# Patient Record
Sex: FEMALE | Race: WHITE | Employment: STUDENT | URBAN - METROPOLITAN AREA
[De-identification: names, ages, dates, MRNs, and addresses within clinical notes are randomized per-mention and may not be internally consistent; named-entity substitution may affect disease eponyms.]

---
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
## 2019-02-07 ENCOUNTER — HOSPITAL ENCOUNTER (EMERGENCY)
Age: 19
Discharge: HOME | End: 2019-02-07
Attending: EMERGENCY MEDICINE
Payer: COMMERCIAL

## 2019-02-07 VITALS
HEIGHT: 62 IN | RESPIRATION RATE: 16 BRPM | DIASTOLIC BLOOD PRESSURE: 62 MMHG | TEMPERATURE: 98 F | HEART RATE: 64 BPM | BODY MASS INDEX: 38.64 KG/M2 | SYSTOLIC BLOOD PRESSURE: 120 MMHG | OXYGEN SATURATION: 99 % | WEIGHT: 210 LBS

## 2019-02-07 DIAGNOSIS — N12 PYELONEPHRITIS: Primary | ICD-10-CM

## 2019-02-07 LAB
BILIRUB UR QL STRIP.AUTO: NEGATIVE
COLOR UR AUTO: YELLOW
GLUCOSE UR STRIP.AUTO-MCNC: NEGATIVE MG/DL
KETONES UR STRIP.AUTO-MCNC: NEGATIVE MG/DL
NITRITE UR QL STRIP.AUTO: NEGATIVE
PH UR STRIP.AUTO: 6.5 [PH] (ref 4.5–8)
POCT LOT NUMBER: NORMAL
POCT URINE PREGNANCY: NEGATIVE
PROT UR STRIP.AUTO-MCNC: NEGATIVE MG/DL
RBC UR QL AUTO: NEGATIVE
SP GR UR STRIP.AUTO: 1.02 (ref 1–1.03)
UROBILINOGEN UR STRIP.AUTO-MCNC: 0.2 MG/DL
WBC #/AREA URNS AUTO: >50 /HPF

## 2019-02-07 PROCEDURE — 87088 URINE BACTERIA CULTURE: CPT | Performed by: EMERGENCY MEDICINE

## 2019-02-07 PROCEDURE — 87086 URINE CULTURE/COLONY COUNT: CPT | Performed by: EMERGENCY MEDICINE

## 2019-02-07 PROCEDURE — 87186 SC STD MICRODIL/AGAR DIL: CPT | Performed by: EMERGENCY MEDICINE

## 2019-02-07 PROCEDURE — 81001 URINALYSIS AUTO W/SCOPE: CPT | Performed by: EMERGENCY MEDICINE

## 2019-02-07 PROCEDURE — 99284 EMERGENCY DEPT VISIT MOD MDM: CPT

## 2019-02-07 PROCEDURE — 99283 EMERGENCY DEPT VISIT LOW MDM: CPT

## 2019-02-07 PROCEDURE — 81025 URINE PREGNANCY TEST: CPT

## 2019-02-07 RX ORDER — SULFAMETHOXAZOLE AND TRIMETHOPRIM 800; 160 MG/1; MG/1
1 TABLET ORAL ONCE
Status: COMPLETED | OUTPATIENT
Start: 2019-02-07 | End: 2019-02-07

## 2019-02-07 RX ORDER — SULFAMETHOXAZOLE AND TRIMETHOPRIM 800; 160 MG/1; MG/1
1 TABLET ORAL 2 TIMES DAILY
Qty: 20 TABLET | Refills: 0 | Status: SHIPPED | OUTPATIENT
Start: 2019-02-07 | End: 2019-02-17

## 2019-02-07 NOTE — ED INITIAL ASSESSMENT (HPI)
Urinary frequency and urgency; right sided flank pain; Pt was on abx one month ago, s/s never resolved

## 2019-02-08 NOTE — ED PROVIDER NOTES
Patient Seen in: [facility] In [place]    History   Patient presents with:  Urinary Symptoms (urologic)    Stated Complaint: urinary symptoms    HPI    Patient is a 4year-old female here with mom concerned about possible kidney infec breath sounds normal. No stridor. Abdominal: Soft. There is no tenderness. There is no guarding. Musculoskeletal: Exhibits no edema or tenderness. Neurological: Pt is alert and oriented to person, place, and time. No cranial nerve deficit.    Skin: Sk

## 2019-02-11 ENCOUNTER — TELEPHONE (OUTPATIENT)
Dept: FAMILY MEDICINE CLINIC | Facility: CLINIC | Age: 19
End: 2019-02-11

## 2019-02-11 NOTE — TELEPHONE ENCOUNTER
Called patient's mother and left her VM to call back. I called patient. She was seen in the ER on 2/7 for a UTI. She had a UTI in December but feels her symptoms never completely resolved. Today she is feeling better but still having some symptoms.  She not

## 2019-02-11 NOTE — TELEPHONE ENCOUNTER
Patient was in the ER and diagnosed with kidney infection, Pt told her mom today that when she goes to the bathroom she still sees blood. Pt's mom  would like an advise if she should schedule appointment with PCP or go back to ER.

## 2019-02-12 ENCOUNTER — OFFICE VISIT (OUTPATIENT)
Dept: FAMILY MEDICINE CLINIC | Facility: CLINIC | Age: 19
End: 2019-02-12
Payer: COMMERCIAL

## 2019-02-12 VITALS
OXYGEN SATURATION: 100 % | BODY MASS INDEX: 39.69 KG/M2 | RESPIRATION RATE: 18 BRPM | DIASTOLIC BLOOD PRESSURE: 76 MMHG | HEART RATE: 72 BPM | TEMPERATURE: 99 F | WEIGHT: 224 LBS | SYSTOLIC BLOOD PRESSURE: 118 MMHG | HEIGHT: 63 IN

## 2019-02-12 DIAGNOSIS — N12 PYELONEPHRITIS: Primary | ICD-10-CM

## 2019-02-12 LAB
MULTISTIX LOT#: ABNORMAL NUMERIC
PH, URINE: 6 (ref 4.5–8)
SPECIFIC GRAVITY: 1.01 (ref 1–1.03)
URINE-COLOR: YELLOW
UROBILINOGEN,SEMI-QN: 0.2 MG/DL (ref 0–1.9)

## 2019-02-12 PROCEDURE — 81003 URINALYSIS AUTO W/O SCOPE: CPT | Performed by: FAMILY MEDICINE

## 2019-02-12 PROCEDURE — 87086 URINE CULTURE/COLONY COUNT: CPT | Performed by: FAMILY MEDICINE

## 2019-02-12 PROCEDURE — 99213 OFFICE O/P EST LOW 20 MIN: CPT | Performed by: FAMILY MEDICINE

## 2019-02-12 NOTE — PROGRESS NOTES
Kaila Hope is a 22year old female. Patient presents with:  ER F/U: THE [facility] Emergency Room f/u 2/7/19 Pylonephritis      HPI:   Patient presents with symptoms of UTI.  Complaining of urinary frequency, urgency, dysuria, suprapubic pain for lastsever don't resolve. Call if fever, vomiting, worsening symptoms.

## 2019-02-15 ENCOUNTER — TELEPHONE (OUTPATIENT)
Dept: FAMILY MEDICINE CLINIC | Facility: CLINIC | Age: 19
End: 2019-02-15

## 2019-04-16 ENCOUNTER — LAB ENCOUNTER (OUTPATIENT)
Dept: LAB | Age: 19
End: 2019-04-16
Attending: NURSE PRACTITIONER
Payer: COMMERCIAL

## 2019-04-16 ENCOUNTER — HOSPITAL ENCOUNTER (OUTPATIENT)
Dept: ULTRASOUND IMAGING | Age: 19
Discharge: HOME | End: 2019-04-16
Attending: NURSE PRACTITIONER
Payer: COMMERCIAL

## 2019-04-16 ENCOUNTER — OFFICE VISIT (OUTPATIENT)
Dept: FAMILY MEDICINE CLINIC | Facility: CLINIC | Age: 19
End: 2019-04-16
Payer: COMMERCIAL

## 2019-04-16 VITALS
HEART RATE: 72 BPM | RESPIRATION RATE: 16 BRPM | OXYGEN SATURATION: 98 % | BODY MASS INDEX: 39.16 KG/M2 | SYSTOLIC BLOOD PRESSURE: 110 MMHG | DIASTOLIC BLOOD PRESSURE: 70 MMHG | HEIGHT: 63 IN | WEIGHT: 221 LBS | TEMPERATURE: 98 F

## 2019-04-16 DIAGNOSIS — R10.9 LEFT FLANK PAIN: ICD-10-CM

## 2019-04-16 DIAGNOSIS — R30.9 PAIN WITH URINATION: Primary | ICD-10-CM

## 2019-04-16 DIAGNOSIS — N10 ACUTE PYELONEPHRITIS: ICD-10-CM

## 2019-04-16 DIAGNOSIS — N20.0 KIDNEY CALCULUS: ICD-10-CM

## 2019-04-16 PROCEDURE — 76770 US EXAM ABDO BACK WALL COMP: CPT | Performed by: NURSE PRACTITIONER

## 2019-04-16 PROCEDURE — 99214 OFFICE O/P EST MOD 30 MIN: CPT | Performed by: NURSE PRACTITIONER

## 2019-04-16 PROCEDURE — 87186 SC STD MICRODIL/AGAR DIL: CPT | Performed by: NURSE PRACTITIONER

## 2019-04-16 PROCEDURE — 87088 URINE BACTERIA CULTURE: CPT | Performed by: NURSE PRACTITIONER

## 2019-04-16 PROCEDURE — 80053 COMPREHEN METABOLIC PANEL: CPT | Performed by: NURSE PRACTITIONER

## 2019-04-16 PROCEDURE — 87086 URINE CULTURE/COLONY COUNT: CPT | Performed by: NURSE PRACTITIONER

## 2019-04-16 PROCEDURE — 81003 URINALYSIS AUTO W/O SCOPE: CPT | Performed by: NURSE PRACTITIONER

## 2019-04-16 PROCEDURE — 36415 COLL VENOUS BLD VENIPUNCTURE: CPT | Performed by: NURSE PRACTITIONER

## 2019-04-16 PROCEDURE — 85025 COMPLETE CBC W/AUTO DIFF WBC: CPT | Performed by: NURSE PRACTITIONER

## 2019-04-16 RX ORDER — ACETAMINOPHEN AND CODEINE PHOSPHATE 120; 12 MG/5ML; MG/5ML
SOLUTION ORAL
COMMUNITY
Start: 2019-04-15 | End: 2019-07-09 | Stop reason: ALTCHOICE

## 2019-04-16 RX ORDER — SULFAMETHOXAZOLE AND TRIMETHOPRIM 800; 160 MG/1; MG/1
1 TABLET ORAL 2 TIMES DAILY
Qty: 20 TABLET | Refills: 0 | Status: SHIPPED | OUTPATIENT
Start: 2019-04-16 | End: 2019-04-26

## 2019-04-16 NOTE — PROGRESS NOTES
CHIEF COMPLAINT:   Patient presents with:  UTI: freq urination, pain with urination nausea and left flank pain      HPI:   Maryam Meng is a 22year old female who presents with symptoms of UTI.  Complaining of urinary frequency, urgency, dysuria for hour(s)). ASSESSMENT AND PLAN:   Diagnoses and all orders for this visit:    Pain with urination  -     URINALYSIS, AUTO, W/O SCOPE  -     URINE CULTURE, ROUTINE; Future    Left flank pain  -     CBC WITH DIFFERENTIAL WITH PLATELET;  Future  -     COMP

## 2019-04-19 ENCOUNTER — TELEPHONE (OUTPATIENT)
Dept: FAMILY MEDICINE CLINIC | Facility: CLINIC | Age: 19
End: 2019-04-19

## 2019-04-19 NOTE — TELEPHONE ENCOUNTER
----- Message from MARGY Medina sent at 4/18/2019  9:34 AM CDT -----  Urine culture - have bacteria -finish antibiotics as prescribed      Notes recorded by MARGY Diana on 4/18/2019 at 9:31 AM CDT  Ordered additional labs for elevated estevan

## 2019-04-25 ENCOUNTER — HOSPITAL ENCOUNTER (OUTPATIENT)
Dept: GENERAL RADIOLOGY | Age: 19
Discharge: HOME | End: 2019-04-25
Attending: PHYSICIAN ASSISTANT
Payer: COMMERCIAL

## 2019-04-25 DIAGNOSIS — N20.0 LEFT NEPHROLITHIASIS: ICD-10-CM

## 2019-04-25 PROCEDURE — 74018 RADEX ABDOMEN 1 VIEW: CPT | Performed by: PHYSICIAN ASSISTANT

## 2019-04-29 NOTE — PROGRESS NOTES
Patient notified via [address] Result has been released, patient account is currently active. No evidence of stone. Assess symptoms, consider CT.

## 2019-05-15 NOTE — TELEPHONE ENCOUNTER
Unable to leave message as mailbox is full. MY chart sent to patient to schedule US. May close message when noted message was read by patient.

## 2019-07-01 ENCOUNTER — HOSPITAL ENCOUNTER (OUTPATIENT)
Dept: ULTRASOUND IMAGING | Age: 19
Discharge: HOME | End: 2019-07-01
Attending: NURSE PRACTITIONER
Payer: COMMERCIAL

## 2019-07-01 ENCOUNTER — TELEPHONE (OUTPATIENT)
Dept: FAMILY MEDICINE CLINIC | Facility: CLINIC | Age: 19
End: 2019-07-01

## 2019-07-01 DIAGNOSIS — N20.0 KIDNEY CALCULUS: ICD-10-CM

## 2019-07-01 DIAGNOSIS — R79.89 ABNORMAL LFTS: ICD-10-CM

## 2019-07-01 DIAGNOSIS — R10.9 LEFT FLANK PAIN: Primary | ICD-10-CM

## 2019-07-01 PROCEDURE — 76705 ECHO EXAM OF ABDOMEN: CPT | Performed by: NURSE PRACTITIONER

## 2019-07-01 PROCEDURE — 76700 US EXAM ABDOM COMPLETE: CPT | Performed by: NURSE PRACTITIONER

## 2019-07-01 NOTE — TELEPHONE ENCOUNTER
US ABDOMEN COMPLETE   Notes recorded by MARGY Raymundo on 7/1/2019 at 2:39 PM CDT  Fatty Liver - low fat diet , showing kidney stone left -referral to see Urologist for evaluation and treatment

## 2019-07-01 NOTE — TELEPHONE ENCOUNTER
Spoke with pt regarding results and instructions listed below. Pt verbalizes understanding. Pt states she has seen Dr. Jay Guerra office before and will follow up with them.

## 2019-07-09 ENCOUNTER — LAB ENCOUNTER (OUTPATIENT)
Dept: LAB | Age: 19
End: 2019-07-09
Attending: FAMILY MEDICINE
Payer: COMMERCIAL

## 2019-07-09 ENCOUNTER — OFFICE VISIT (OUTPATIENT)
Dept: FAMILY MEDICINE CLINIC | Facility: CLINIC | Age: 19
End: 2019-07-09
Payer: COMMERCIAL

## 2019-07-09 VITALS
TEMPERATURE: 99 F | SYSTOLIC BLOOD PRESSURE: 116 MMHG | WEIGHT: 220 LBS | DIASTOLIC BLOOD PRESSURE: 72 MMHG | OXYGEN SATURATION: 99 % | HEIGHT: 63 IN | HEART RATE: 74 BPM | BODY MASS INDEX: 38.98 KG/M2 | RESPIRATION RATE: 20 BRPM

## 2019-07-09 DIAGNOSIS — F32.A MILD DEPRESSION: ICD-10-CM

## 2019-07-09 DIAGNOSIS — N20.0 NEPHROLITHIASIS: ICD-10-CM

## 2019-07-09 DIAGNOSIS — Z00.00 ROUTINE GENERAL MEDICAL EXAMINATION AT A HEALTH CARE FACILITY: Primary | ICD-10-CM

## 2019-07-09 DIAGNOSIS — E66.01 SEVERE OBESITY (BMI 35.0-39.9) WITH COMORBIDITY (HCC): ICD-10-CM

## 2019-07-09 DIAGNOSIS — R74.8 ELEVATED LIVER ENZYMES: ICD-10-CM

## 2019-07-09 DIAGNOSIS — R53.83 OTHER FATIGUE: ICD-10-CM

## 2019-07-09 DIAGNOSIS — R74.8 ELEVATED LIVER ENZYMES: Primary | ICD-10-CM

## 2019-07-09 LAB
ALBUMIN SERPL-MCNC: 3.8 G/DL (ref 3.4–5)
ALP LIVER SERPL-CCNC: 114 U/L (ref 52–144)
ALT SERPL-CCNC: 80 U/L (ref 13–56)
ANION GAP SERPL CALC-SCNC: 8 MMOL/L (ref 0–18)
AST SERPL-CCNC: 33 U/L (ref 15–37)
BILIRUB DIRECT SERPL-MCNC: 0.2 MG/DL (ref 0–0.2)
BILIRUB SERPL-MCNC: 1.5 MG/DL (ref 0.1–2)
BILIRUB UR QL STRIP.AUTO: NEGATIVE
BUN BLD-MCNC: 17 MG/DL (ref 7–18)
BUN/CREAT SERPL: 19.1 (ref 10–20)
CALCIUM BLD-MCNC: 9.6 MG/DL (ref 8.5–10.1)
CERULOPLASMIN SERPL-MCNC: 29.7 MG/DL (ref 20–60)
CHLORIDE SERPL-SCNC: 109 MMOL/L (ref 98–112)
CO2 SERPL-SCNC: 24 MMOL/L (ref 21–32)
COLOR UR AUTO: YELLOW
CREAT BLD-MCNC: 0.89 MG/DL (ref 0.55–1.02)
GLUCOSE BLD-MCNC: 94 MG/DL (ref 70–99)
GLUCOSE UR STRIP.AUTO-MCNC: NEGATIVE MG/DL
HBV CORE AB SERPL QL IA: NONREACTIVE
HBV SURFACE AB SER QL: NONREACTIVE
HBV SURFACE AB SERPL IA-ACNC: 3.11 MIU/ML
HBV SURFACE AG SER-ACNC: <0.1 [IU]/L
HBV SURFACE AG SERPL QL IA: NONREACTIVE
HCV AB SERPL QL IA: NONREACTIVE
IRON SATURATION: 29 % (ref 15–50)
IRON SERPL-MCNC: 147 UG/DL (ref 50–170)
KETONES UR STRIP.AUTO-MCNC: NEGATIVE MG/DL
M PROTEIN MFR SERPL ELPH: 8.2 G/DL (ref 6.4–8.2)
NITRITE UR QL STRIP.AUTO: NEGATIVE
OSMOLALITY SERPL CALC.SUM OF ELEC: 293 MOSM/KG (ref 275–295)
PH UR STRIP.AUTO: 5 [PH] (ref 4.5–8)
POTASSIUM SERPL-SCNC: 3.9 MMOL/L (ref 3.5–5.1)
PROT UR STRIP.AUTO-MCNC: NEGATIVE MG/DL
RBC UR QL AUTO: NEGATIVE
SODIUM SERPL-SCNC: 141 MMOL/L (ref 136–145)
SP GR UR STRIP.AUTO: 1.03 (ref 1–1.03)
T4 FREE SERPL-MCNC: 1.1 NG/DL (ref 0.9–1.6)
TOTAL IRON BINDING CAPACITY: 511 UG/DL (ref 240–450)
TRANSFERRIN SERPL-MCNC: 343 MG/DL (ref 200–360)
TSI SER-ACNC: 2 MIU/ML (ref 0.36–3.74)
UROBILINOGEN UR STRIP.AUTO-MCNC: <2 MG/DL
VIT D+METAB SERPL-MCNC: 16.3 NG/ML (ref 30–100)

## 2019-07-09 PROCEDURE — 86704 HEP B CORE ANTIBODY TOTAL: CPT

## 2019-07-09 PROCEDURE — 84439 ASSAY OF FREE THYROXINE: CPT

## 2019-07-09 PROCEDURE — 87340 HEPATITIS B SURFACE AG IA: CPT

## 2019-07-09 PROCEDURE — 83516 IMMUNOASSAY NONANTIBODY: CPT

## 2019-07-09 PROCEDURE — 99395 PREV VISIT EST AGE 18-39: CPT | Performed by: FAMILY MEDICINE

## 2019-07-09 PROCEDURE — 86706 HEP B SURFACE ANTIBODY: CPT

## 2019-07-09 PROCEDURE — 82390 ASSAY OF CERULOPLASMIN: CPT

## 2019-07-09 PROCEDURE — 84443 ASSAY THYROID STIM HORMONE: CPT

## 2019-07-09 NOTE — PROGRESS NOTES
Kayla Bahena is a 22year old female who presents for a complete physical exam, no gyn. HPI:     Patient presents with:  Physical      Patient feels well, dental visit up to date, no hearing problem. Vaccinations: refuses HPV vaccine.   Not sexually denies any unusual skin lesions or rashes  EYES: no visual complaints or deficits  HEENT: denies nasal congestion, sinus pain or sore throat; hearing loss negative   RESPIRATORY: denies shortness of breath, wheezing or cough   CARDIOVASCULAR: denies chest exercise 30 minutes three times weekly. Other fatigue  -     VITAMIN D, 25-HYDROXY  -     BASIC METABOLIC PANEL (8)  -     TSH+FREE T4; Future    Elevated liver enzymes  -     HEPATIC FUNCTION PANEL (7)  -     HEPATITIS B PROFILE;  Future  -     HC

## 2019-07-09 NOTE — PATIENT INSTRUCTIONS
Kidney Stones: Are You at Risk? People who form kidney stones often share certain risk factors. Middle-aged men, for instance, are more likely to form stones than other people. A family history of stones also increases your risk.  Assess your risk factor As your healthcare providers help treat your depression, you can also help yourself. Keep in mind that your illness affects you emotionally, physically, mentally, and socially. So full recovery will take time.  Take care of your body and your soul, and be p · Be with others. Don’t isolate yourself—you’ll only feel worse. Try to be with other people. And take part in fun activities when you can. Go to a movie, ballgame, Holiness service, or social event.  Talk openly with people you can trust. And accept help Alcohol misuse All women in this age group At routine exams   Blood pressure All women in this age group Yearly checkup if your blood pressure is normal  Normal blood pressure is less than 120/80 mm Hg  If your blood pressure reading is higher than normal, Vision All women in this age group At least 1 complete exam in your 25s, and 2 in your 35s   Vaccine2 Who needs it How often   Chickenpox (varicella) All women in this age group who have no record of this infection or vaccine 2 doses; the second dose shoul BRCA gene mutation testing for breast and ovarian cancer susceptibility Women with increased risk for having gene mutation When your risk is known   Breast cancer and chemoprevention Women at high risk for breast cancer When your risk is known   Diet and e

## 2019-07-10 ENCOUNTER — TELEPHONE (OUTPATIENT)
Dept: FAMILY MEDICINE CLINIC | Facility: CLINIC | Age: 19
End: 2019-07-10

## 2019-07-10 NOTE — TELEPHONE ENCOUNTER
----- Message from TREVER Palomino-RALPH sent at 7/10/2019  2:36 PM CDT -----  Not immune to Hepatitis B -can start immunizations as nurses visit

## 2019-07-10 NOTE — TELEPHONE ENCOUNTER
[facility] msg sent to pt and flagged for receipt. Other labs still in process. Will hold TE for addtl results.

## 2019-07-10 NOTE — TELEPHONE ENCOUNTER
Results directly below sent to pt via [employer] (2nd message). Awaiting addtl results.      ----- Message from MARGY Sterling sent at 7/10/2019  2:39 PM CDT -----  Vitamin D is very low -sent new prescription to [facility] for Vitamin D take it once a week , N

## 2019-07-11 LAB — MITOCHONDRIAL M2 AB, IGG: 3.9 UNITS

## 2019-12-05 DIAGNOSIS — E55.9 VITAMIN D DEFICIENCY: ICD-10-CM

## 2019-12-05 NOTE — TELEPHONE ENCOUNTER
Medication(s) to Refill:   Requested Prescriptions     Pending Prescriptions Disp Refills   • ERGOCALCIFEROL 1.25 MG (77462 UT) Oral Cap [Pharmacy Med Name: VITAMIN D2 1.25MG(50,000 UNIT)] 8 capsule 1     Sig: TAKE ONE CAPSULE BY MOUTH ONE TIME PER WEEK

## 2019-12-06 RX ORDER — ERGOCALCIFEROL 1.25 MG/1
CAPSULE ORAL
Qty: 4 CAPSULE | Refills: 0 | Status: SHIPPED | OUTPATIENT
Start: 2019-12-06

## 2020-09-11 ENCOUNTER — PATIENT MESSAGE (OUTPATIENT)
Dept: FAMILY MEDICINE CLINIC | Facility: CLINIC | Age: 20
End: 2020-09-11

## 2020-09-14 NOTE — TELEPHONE ENCOUNTER
From: Brittney Michelle  To: Annabelle Matias MD  Sent: 9/11/2020 8:58 PM CDT  Subject: Prescription Question    I was wanting to know if you are able to prescribe me something for depression and my ADHD?  During this time I am finding myself not able to foc

## (undated) NOTE — MR AVS SNAPSHOT
[address] [facility] [address] [phone]               Thank you for choosing us for your health care visit with MISTY De Los Santos.   We are glad to serve you and happy to provide you with this summary of y · If medicines for diarrhea or vomiting are prescribed, take only as directed. General care and preventing spread of the illness  · If symptoms are severe, rest at home for the next 24 hours or until you feel better.   · Hand washing with soap and water is · Gradually resume a normal diet, as you feel better and your symptoms improve. · If at any time your symptoms start getting worse, go back to clear liquids until you feel better.   Food preparation  · If you have diarrhea, you should not prepare food for 208 lb (98 %*, Z = 2.09) 36.85 kg/m2 (98 %*, Z = 2.15)    *Growth percentiles are based on CDC 2-20 Years data     BP percentiles are based on 2000 NHANES data         Current Medications          This list is accurate as of: 2/9/17  1:46 PM.  Always use often. Sometimes toddlers need to try a food 10 times before they actually accept and enjoy it. It is also important to encourage play time as soon as they start crawling and walking.  As your children grow, continue to help them live a healthy active lifes

## (undated) NOTE — Clinical Note
Date: 2/9/2017    Patient Name: Samuel Rowley          To Whom it may concern: This letter has been written at the patient's request. The above patient was seen at the [facility] for treatment of a medical condition.     This patient kamila

## (undated) NOTE — ED AVS SNAPSHOT
Kirstie Helm   MRN: [id number]    Department:  [address] [facility] in [place]   Date of Visit:  12/22/2018           Disclosure     Insurance plans vary and the physician(s) referred by the ER may not be covered by your plan.  Please cont tell this physician (or your personal doctor if your instructions are to return to your personal doctor) about any new or lasting problems. The primary care or specialist physician will see patients referred from the [facility] Emergency Department.  Cheryle Levins

## (undated) NOTE — LETTER
Date & Time: 12/22/2018, 11:37 PM  Patient: Kimmy Woods  Encounter Provider(s):    Ulyses Meigs, MD       To Whom It May Concern:    Pavithra Shay was seen and treated in our department on 12/22/2018. She should not return to work until 12/28/18.

## (undated) NOTE — LETTER
May 15, 2019    [facility] [address] [place] [address]      Dear Benjamin Scott: The following are the results of your recent tests. Please review the list of test results.   Your result is the value on the left; we have also supplied t Immature Granulocyte % 0.3 %       Please call if you have further questions,    ***

## (undated) NOTE — ED AVS SNAPSHOT
Ruben Patrick   MRN: [id number]    Department:  THE [facility] Emergency Department in [place]   Date of Visit:  2/7/2019           Disclosure     Insurance plans vary and the physician(s) referred by the ER may not be covered by your plan.  Please contac tell this physician (or your personal doctor if your instructions are to return to your personal doctor) about any new or lasting problems. The primary care or specialist physician will see patients referred from the [facility] Emergency Department.  Mello Gonzalez

## (undated) NOTE — LETTER
Date & Time: 2/7/2019, 5:15 PM  Patient: Zacarias Velazquez  Encounter Provider(s):    Samir Yung MD       To Whom It May Concern:    Richard Owens was seen and treated in our department on 2/7/2019.  She can be excused from class on 2/5/2019 and may ret

## (undated) NOTE — Clinical Note
Date: 3/21/2017    Patient Name: Susi Cousin          To Whom it may concern: The above patient was seen at the [facility] for treatment of a medical condition. This patient should be excused from attending work/school on 3/21/17.